# Patient Record
Sex: MALE | Race: WHITE | NOT HISPANIC OR LATINO | URBAN - METROPOLITAN AREA
[De-identification: names, ages, dates, MRNs, and addresses within clinical notes are randomized per-mention and may not be internally consistent; named-entity substitution may affect disease eponyms.]

---

## 2018-03-09 ENCOUNTER — RECORDS - HEALTHEAST (OUTPATIENT)
Dept: LAB | Facility: CLINIC | Age: 33
End: 2018-03-09

## 2018-03-09 LAB
CHOLEST SERPL-MCNC: 163 MG/DL
FASTING STATUS PATIENT QL REPORTED: YES
HDLC SERPL-MCNC: 35 MG/DL
LDLC SERPL CALC-MCNC: 108 MG/DL
TRIGL SERPL-MCNC: 102 MG/DL

## 2019-01-29 ENCOUNTER — OFFICE VISIT - HEALTHEAST (OUTPATIENT)
Dept: CARDIOLOGY | Facility: CLINIC | Age: 34
End: 2019-01-29

## 2019-01-29 ENCOUNTER — AMBULATORY - HEALTHEAST (OUTPATIENT)
Dept: CARDIOLOGY | Facility: CLINIC | Age: 34
End: 2019-01-29

## 2019-01-29 ENCOUNTER — RECORDS - HEALTHEAST (OUTPATIENT)
Dept: ADMINISTRATIVE | Facility: OTHER | Age: 34
End: 2019-01-29

## 2019-01-29 DIAGNOSIS — R07.1 CHEST PAIN ON BREATHING: ICD-10-CM

## 2019-01-29 LAB
ATRIAL RATE - MUSE: 76 BPM
DIASTOLIC BLOOD PRESSURE - MUSE: NORMAL MMHG
INTERPRETATION ECG - MUSE: NORMAL
P AXIS - MUSE: 51 DEGREES
PR INTERVAL - MUSE: 154 MS
QRS DURATION - MUSE: 98 MS
QT - MUSE: 388 MS
QTC - MUSE: 436 MS
R AXIS - MUSE: 36 DEGREES
SYSTOLIC BLOOD PRESSURE - MUSE: NORMAL MMHG
T AXIS - MUSE: 2 DEGREES
VENTRICULAR RATE- MUSE: 76 BPM

## 2019-01-29 RX ORDER — MOMETASONE FUROATE AND FORMOTEROL FUMARATE DIHYDRATE 100; 5 UG/1; UG/1
AEROSOL RESPIRATORY (INHALATION)
Refills: 5 | Status: SHIPPED | COMMUNITY
Start: 2018-09-05

## 2019-01-29 RX ORDER — LORAZEPAM 0.5 MG/1
TABLET ORAL PRN
Status: SHIPPED | COMMUNITY
Start: 2019-01-29

## 2019-01-29 RX ORDER — ALBUTEROL SULFATE 90 UG/1
AEROSOL, METERED RESPIRATORY (INHALATION)
Refills: 1 | Status: SHIPPED | COMMUNITY
Start: 2018-09-05

## 2019-01-29 ASSESSMENT — MIFFLIN-ST. JEOR: SCORE: 1938.7

## 2019-01-30 ENCOUNTER — AMBULATORY - HEALTHEAST (OUTPATIENT)
Dept: CARDIOLOGY | Facility: CLINIC | Age: 34
End: 2019-01-30

## 2019-02-05 ENCOUNTER — HOSPITAL ENCOUNTER (OUTPATIENT)
Dept: CARDIOLOGY | Facility: CLINIC | Age: 34
Discharge: HOME | End: 2019-02-05
Attending: INTERNAL MEDICINE

## 2019-02-05 ENCOUNTER — COMMUNICATION - HEALTHEAST (OUTPATIENT)
Dept: CARDIOLOGY | Facility: CLINIC | Age: 34
End: 2019-02-05

## 2019-02-05 DIAGNOSIS — R07.1 CHEST PAIN ON BREATHING: ICD-10-CM

## 2019-02-05 ASSESSMENT — MIFFLIN-ST. JEOR: SCORE: 1938.7

## 2019-02-06 ENCOUNTER — COMMUNICATION - HEALTHEAST (OUTPATIENT)
Dept: CARDIOLOGY | Facility: CLINIC | Age: 34
End: 2019-02-06

## 2019-02-06 LAB
AORTIC ROOT: 3.5 CM
AORTIC VALVE MEAN VELOCITY: 89.2 CM/S
ASCENDING AORTA: 3.1 CM
AV DIMENSIONLESS INDEX VTI: 0.8
AV MEAN GRADIENT: 3 MMHG
AV PEAK GRADIENT: 6 MMHG
AV VALVE AREA: 2.9 CM2
AV VELOCITY RATIO: 0.8
BSA FOR ECHO PROCEDURE: 2.23 M2
CV BLOOD PRESSURE: ABNORMAL MMHG
CV ECHO HEIGHT: 68 IN
CV ECHO WEIGHT: 228 LBS
DOP CALC AO PEAK VEL: 122 CM/S
DOP CALC AO VTI: 21.9 CM
DOP CALC LVOT AREA: 3.8 CM2
DOP CALC LVOT DIAMETER: 2.2 CM
DOP CALC LVOT PEAK VEL: 100 CM/S
DOP CALC LVOT STROKE VOLUME: 62.7 CM3
DOP CALCLVOT PEAK VEL VTI: 16.5 CM
EJECTION FRACTION: 76 % (ref 55–75)
FRACTIONAL SHORTENING: 35.4 % (ref 28–44)
INTERVENTRICULAR SEPTUM IN END DIASTOLE: 1.1 CM (ref 0.6–1)
IVS/PW RATIO: 1.1
LA AREA 1: 17.4 CM2
LA AREA 2: 21.4 CM2
LEFT ATRIUM LENGTH: 5.3 CM
LEFT ATRIUM SIZE: 3.6 CM
LEFT ATRIUM VOLUME INDEX: 26.8 ML/M2
LEFT ATRIUM VOLUME: 59.7 ML
LEFT VENTRICLE CARDIAC INDEX: 1.8 L/MIN/M2
LEFT VENTRICLE CARDIAC OUTPUT: 4 L/MIN
LEFT VENTRICLE DIASTOLIC VOLUME INDEX: 31.4 CM3/M2 (ref 34–74)
LEFT VENTRICLE DIASTOLIC VOLUME: 70 CM3 (ref 62–150)
LEFT VENTRICLE HEART RATE: 64 BPM
LEFT VENTRICLE MASS INDEX: 81.6 G/M2
LEFT VENTRICLE SYSTOLIC VOLUME INDEX: 7.6 CM3/M2 (ref 11–31)
LEFT VENTRICLE SYSTOLIC VOLUME: 17 CM3 (ref 21–61)
LEFT VENTRICULAR INTERNAL DIMENSION IN DIASTOLE: 4.8 CM (ref 4.2–5.8)
LEFT VENTRICULAR INTERNAL DIMENSION IN SYSTOLE: 3.1 CM (ref 2.5–4)
LEFT VENTRICULAR MASS: 181.9 G
LEFT VENTRICULAR OUTFLOW TRACT MEAN GRADIENT: 2 MMHG
LEFT VENTRICULAR OUTFLOW TRACT MEAN VELOCITY: 62.8 CM/S
LEFT VENTRICULAR OUTFLOW TRACT PEAK GRADIENT: 4 MMHG
LEFT VENTRICULAR POSTERIOR WALL IN END DIASTOLE: 1 CM (ref 0.6–1)
LV STROKE VOLUME INDEX: 28.1 ML/M2
MITRAL VALVE E/A RATIO: 1.3
MV AVERAGE E/E' RATIO: 7.4 CM/S
MV DECELERATION TIME: 180 MS
MV E'TISSUE VEL-LAT: 11.4 CM/S
MV E'TISSUE VEL-MED: 7.13 CM/S
MV LATERAL E/E' RATIO: 6
MV MEDIAL E/E' RATIO: 9.6
MV PEAK A VELOCITY: 52.4 CM/S
MV PEAK E VELOCITY: 68.2 CM/S
NUC REST DIASTOLIC VOLUME INDEX: 3648 LBS
NUC REST SYSTOLIC VOLUME INDEX: 68 IN
TRICUSPID VALVE ANULAR PLANE SYSTOLIC EXCURSION: 2.1 CM

## 2019-02-07 ENCOUNTER — HOSPITAL ENCOUNTER (OUTPATIENT)
Dept: CT IMAGING | Facility: CLINIC | Age: 34
Discharge: HOME | End: 2019-02-07
Attending: INTERNAL MEDICINE

## 2019-02-07 DIAGNOSIS — R07.1 CHEST PAIN ON BREATHING: ICD-10-CM

## 2019-02-07 LAB
CREAT BLD-MCNC: 1.4 MG/DL
POC GFR AMER AF HE - HISTORICAL: >60 ML/MIN/1.73M2
POC GFR NON AMER AF HE - HISTORICAL: 58 ML/MIN/1.73M2

## 2019-02-07 ASSESSMENT — MIFFLIN-ST. JEOR
SCORE: 1879.73
SCORE: 1938.7

## 2019-02-11 LAB
BSA FOR ECHO PROCEDURE: 2.16 M2
CCTA AORTIC ROOT ANNULUS: 4.1 CM
CV CALCIUM SCORE AGATSTON LM: 0
CV CALCIUM SCORING AGATSON LAD: 2
CV CALCIUM SCORING AGATSTON CX: 0
CV CALCIUM SCORING AGATSTON RCA: 1
CV CALCIUM SCORING AGATSTON TOTAL: 3
LEFT VENTRICLE HEART RATE: 71 BPM

## 2019-02-12 ENCOUNTER — COMMUNICATION - HEALTHEAST (OUTPATIENT)
Dept: CARDIOLOGY | Facility: CLINIC | Age: 34
End: 2019-02-12

## 2021-05-28 ENCOUNTER — RECORDS - HEALTHEAST (OUTPATIENT)
Dept: ADMINISTRATIVE | Facility: CLINIC | Age: 36
End: 2021-05-28

## 2021-06-02 VITALS — BODY MASS INDEX: 34.56 KG/M2 | HEIGHT: 68 IN | WEIGHT: 228 LBS

## 2021-06-02 VITALS — WEIGHT: 215 LBS | HEIGHT: 68 IN | BODY MASS INDEX: 32.58 KG/M2

## 2021-06-02 VITALS — HEIGHT: 68 IN | WEIGHT: 228 LBS | BODY MASS INDEX: 34.56 KG/M2

## 2021-06-23 NOTE — PROGRESS NOTES
Consultation - [facility] Heart care  Vinod Crespo,  1985, MRN [id number]    PCP: System, Provider Not In, None    Assessment and Plan: Chest pain Hx pleurisy, atypical for angina  Recommendations: Unclear reason for ecg change, nature of chest pain seems noncardiac.  Would check echo for structural heart changes and in view of ecg assess for IHD.  Favor CTA.  Trial ibuprofen 600 three times a day, possible pericarditis    Chief Complaint:  Chest pain    HPI:  We have been requested by Dr Avalos to evaluate Vinod Crespo for consultation who is a  33 y.o. year old male for above chief complaint.  Hx: 32 yo M with chronic recurrent pleurisy.  Began about 8 years ago.  Happens 1-2 a year.  Feels multiple types of discomfort, sharp, dull, usually a minute or two then dull and resolves.  No associate sx ie shortness of breath. Feels sx accenuation with inspiration.   Feels occasional dizziness with or without these symptoms.  No hx htn, hld, diabetes or smoking.  Exercise such as ice skating and doesn't have these symptoms.  Has had previous stress echo which is normal.  ECG usually normal but ecg recent witn ns st-t wave changes.         Current Outpatient Medications:      aspirin 81 MG EC tablet, Take 81 mg by mouth daily., Disp: , Rfl:      DULERA 100-5 mcg/actuation HFAA inhaler, INHALE 2 PUFFS BY MOUTH 2 TIMES A DAY, Disp: , Rfl: 5     fluticasone (FLONASE) 50 mcg/actuation nasal spray, Apply 1 spray into each nostril daily., Disp: , Rfl:      LORazepam (ATIVAN) 0.5 MG tablet, Take by mouth as needed for anxiety., Disp: , Rfl:      VENTOLIN HFA 90 mcg/actuation inhaler, INHALE 2 PUFFS BY MOUTH 4 TIMES DAILY AS NEEDED FOR SHORTNESS OF BREATH, Disp: , Rfl: 1  Medical History  There are no active non-hospital problems to display for this patient.    No past medical history on file.    Surgical History  He  has no past surgical history on file.    Social History  Reviewed, and he  reports that  has never smoked.  "he has never used smokeless tobacco.  Smoking status reviewed.  Social history othrwise not contributory to HPI.  Allergies  No Known Allergies    Family History  Reviewed, and family history is not on file.  Extended Emergency Contact Information  Primary Emergency Contact: SYLVIA CORTES  Work Phone: [phone]  Relation: Spouse  Secondary Emergency Contact: none, per pt  Relation: Declined  Family history otherwise negative or not conributory to HPI.    Psychosocial Needs  Social History     Social History Narrative     Not on file     Additional psychosocial needs reviewed per nursing assessment.    Prior to Admission Medications    (Not in a hospital admission)    Review of Systems:  A 12 point comprehensive review of systems was negative except as noted.  Review of systems is negative except for HPI  Physical Exam:  Less than 10 mL of urine  Vitals:    01/29/19 0930   BP: (!) 140/92   Pulse: 100   Resp: 16     Head and neck without focal cranial neurologic defects.  JVD not distended.  Carotid upstroke normal without bruit.  External eye exam normal without icterus.  External ear exam normal.  Neck without cervical lymphadenopathy or thyromegaly.  BP (!) 140/92 (Patient Site: Left Arm, Patient Position: Sitting, Cuff Size: Adult Large)   Pulse 100   Resp 16   Ht 5' 8\" (1.727 m)   Wt (!) 228 lb (103.4 kg)   BMI 34.67 kg/m    General appearance: alert, appears stated age and cooperative  Lungs: clear to auscultation bilaterally  Heart: regular rate and rhythm, S1, S2 normal, no murmur, click, rub or gallop   Abdomen with normal bowel tones.  Skin without rash, ecchymosis, lesions.  Neuromuscular tone normal.  Peripheral pulse intact and equal.  Joints without swelling or erythema.      [unfilled]    Pertinent Labs  Lab Results: personally reviewed.   No results found for: NA, K, CL, CO2, BUN, CREATININE, GLUCOSE, CALCIUM  No results found for: CKTOTAL, CKMB, CKMBINDEX, TROPONINI  No results found for: " WBC, HGB, HCT, MCV, PLT  Lab Results   Component Value Date    CHOL 163 03/09/2018    TRIG 102 03/09/2018    HDL 35 (L) 03/09/2018       Pertinent Radiology  Radiology Results: See Report  EKG Results: See Report       Current Outpatient Medications:      aspirin 81 MG EC tablet, Take 81 mg by mouth daily., Disp: , Rfl:      DULERA 100-5 mcg/actuation HFAA inhaler, INHALE 2 PUFFS BY MOUTH 2 TIMES A DAY, Disp: , Rfl: 5     fluticasone (FLONASE) 50 mcg/actuation nasal spray, Apply 1 spray into each nostril daily., Disp: , Rfl:      LORazepam (ATIVAN) 0.5 MG tablet, Take by mouth as needed for anxiety., Disp: , Rfl:      VENTOLIN HFA 90 mcg/actuation inhaler, INHALE 2 PUFFS BY MOUTH 4 TIMES DAILY AS NEEDED FOR SHORTNESS OF BREATH, Disp: , Rfl: 1

## 2021-06-23 NOTE — TELEPHONE ENCOUNTER
Called patient with echo results-- which patients was very glad to receive. Patient also has coronary CTA with calcium score scheduled 2/7/19.    Patient reports that since starting ibuprofen regimen his chest pain and fatigue has greatly improved. But, he is having some mild GI upset from ibuprofen. Patient admitted he was not eating much when taking the ibuprofen-- he was instructed to make sure he takes it with a substantial snack or meal to help prevent further GI upset. He also wonders if it would be worth repeating an EKG now that his pain has improved.    Dr. Rhodes, how long should patient stay on ibuprofen? Do you think patient should have repeat EKG?

## 2021-06-24 NOTE — TELEPHONE ENCOUNTER
Return call to patient regarding results to [facility]. Patient anxious to receive results. Patient informed that results are not available at this time and that he would be contacted as soon as results where available. Patient verbalized understanding-- no other questions concerns at this time. -monica

## 2021-06-24 NOTE — TELEPHONE ENCOUNTER
----- Message from Odessa Luz sent at 2/11/2019  1:07 PM CST -----  Contact: PATIENT  General phone call:  PATIENT CALLING FOR CT RESULTS, PLEASE CALL  Caller: PATIENT  Primary cardiologist: DR STROUD  Detailed reason for call: SEE ABOVE  New or active symptoms? NO  Best phone number: [phone]  Best time to contact: ANY TIME  Ok to leave a detailed message? YES  Device? NO    Additional Info:

## 2021-06-26 ENCOUNTER — HEALTH MAINTENANCE LETTER (OUTPATIENT)
Age: 36
End: 2021-06-26

## 2021-10-16 ENCOUNTER — HEALTH MAINTENANCE LETTER (OUTPATIENT)
Age: 36
End: 2021-10-16

## 2022-07-17 ENCOUNTER — HEALTH MAINTENANCE LETTER (OUTPATIENT)
Age: 37
End: 2022-07-17

## 2022-09-25 ENCOUNTER — HEALTH MAINTENANCE LETTER (OUTPATIENT)
Age: 37
End: 2022-09-25

## 2023-08-05 ENCOUNTER — HEALTH MAINTENANCE LETTER (OUTPATIENT)
Age: 38
End: 2023-08-05

## 2024-12-10 ENCOUNTER — LAB REQUISITION (OUTPATIENT)
Dept: LAB | Facility: CLINIC | Age: 39
End: 2024-12-10

## 2024-12-10 DIAGNOSIS — Z13.1 ENCOUNTER FOR SCREENING FOR DIABETES MELLITUS: ICD-10-CM

## 2024-12-10 DIAGNOSIS — E66.9 OBESITY, UNSPECIFIED: ICD-10-CM

## 2024-12-10 DIAGNOSIS — Z13.220 ENCOUNTER FOR SCREENING FOR LIPOID DISORDERS: ICD-10-CM

## 2024-12-10 PROCEDURE — 84443 ASSAY THYROID STIM HORMONE: CPT | Performed by: FAMILY MEDICINE

## 2024-12-10 PROCEDURE — 80053 COMPREHEN METABOLIC PANEL: CPT | Performed by: FAMILY MEDICINE

## 2024-12-10 PROCEDURE — 80061 LIPID PANEL: CPT | Performed by: FAMILY MEDICINE

## 2024-12-11 LAB
ALBUMIN SERPL BCG-MCNC: 4.6 G/DL (ref 3.5–5.2)
ALP SERPL-CCNC: 52 U/L (ref 40–150)
ALT SERPL W P-5'-P-CCNC: 32 U/L (ref 0–70)
ANION GAP SERPL CALCULATED.3IONS-SCNC: 13 MMOL/L (ref 7–15)
AST SERPL W P-5'-P-CCNC: 25 U/L (ref 0–45)
BILIRUB SERPL-MCNC: 0.9 MG/DL
BUN SERPL-MCNC: 15 MG/DL (ref 6–20)
CALCIUM SERPL-MCNC: 9.2 MG/DL (ref 8.8–10.4)
CHLORIDE SERPL-SCNC: 102 MMOL/L (ref 98–107)
CHOLEST SERPL-MCNC: 204 MG/DL
CREAT SERPL-MCNC: 1.05 MG/DL (ref 0.67–1.17)
EGFRCR SERPLBLD CKD-EPI 2021: >90 ML/MIN/1.73M2
FASTING STATUS PATIENT QL REPORTED: NO
FASTING STATUS PATIENT QL REPORTED: NO
GLUCOSE SERPL-MCNC: 99 MG/DL (ref 70–99)
HCO3 SERPL-SCNC: 23 MMOL/L (ref 22–29)
HDLC SERPL-MCNC: 47 MG/DL
LDLC SERPL CALC-MCNC: 127 MG/DL
NONHDLC SERPL-MCNC: 157 MG/DL
POTASSIUM SERPL-SCNC: 3.7 MMOL/L (ref 3.4–5.3)
PROT SERPL-MCNC: 6.9 G/DL (ref 6.4–8.3)
SODIUM SERPL-SCNC: 138 MMOL/L (ref 135–145)
TRIGL SERPL-MCNC: 150 MG/DL
TSH SERPL DL<=0.005 MIU/L-ACNC: 1.27 UIU/ML (ref 0.3–4.2)